# Patient Record
Sex: FEMALE | Employment: OTHER | URBAN - METROPOLITAN AREA
[De-identification: names, ages, dates, MRNs, and addresses within clinical notes are randomized per-mention and may not be internally consistent; named-entity substitution may affect disease eponyms.]

---

## 2022-04-13 NOTE — TELEPHONE ENCOUNTER
No imaging in care everywhere or [facility]  Spoke to patients daughter who states the will bring CD with MRI and Xrays.     -Need to know facility patient had imaging done at.:[facility]

## 2022-04-13 NOTE — TELEPHONE ENCOUNTER
Patient is coming in for Left knee Meniscus tear Patient had imaging done at external facility,Imaging can be viewed in Epic. Please review imaging, and if further imaging is needed please place Rx .   Future Appointments   Date Time Provider Nellie Stevenson   4/21/2022  2:00 PM Luiza Okeefe MD [facility] [id number]

## 2022-04-28 NOTE — TELEPHONE ENCOUNTER
pls schedule pt's LT KNEE MONOVISC procedure with either KAUSHIK or TRACY at [facility] location. Thanks!

## 2022-05-02 NOTE — TELEPHONE ENCOUNTER
Future Appointments   Date Time Provider Nellie Stevenson   5/12/2022 11:00 AM Keo Matthews MD [facility] [id number]

## 2022-05-12 NOTE — PROGRESS NOTES
[facility] Follow-up Progress Note        Chief Complaint:  Left knee pain     History: The patient is a 74year old female who returns for viscosupplement knee injection. The patient is diagnosed with osteoarthritis. The patient is here for administration of Monovisc after having undergone the pre-certification and insurance approval process. No new symptoms are reported. Physical Exam: On examination there is no apparent swelling or palpable warmth about the affected left knee. Mild to moderate crepitus is palpable from near full extension and 110 degrees of flexion. Knee remains stable in all planes with no distal edema. Neurovascular status is intact. Assessment: Diagnoses and all orders for this visit:  Diagnoses and all orders for this visit:    Primary osteoarthritis of left knee       Plan:  The patient presents for viscosupplement knee injection today. Patient understands the nature and risks and gives written consent to proceed. The patient was advised that the benefit may not be noted for 2-4 additional weeks. If there are any adverse reactions I asked that the patient contact us for urgent reassessment. Otherwise, the patient may follow-up as needed. Visco supplement Injection Procedure:  After discussing the risk benefits and alternatives to visco supplement injection including but not limited to needle infection, hypersensitivity reaction or failed improvement, the patient gave verbal consent to proceed. Using meticulous sterile technique I injected 4 cc of 1% Xylocaine at the lateral patellofemoral joint of the left knee for local anesthesia. After attempted aspiration, I injected the entire contents of the Monovisc syringe through an 18-gauge needle to minimal resistance. The patient tolerated this well, a Band-Aid was applied, and instructions were given to contact us with any adverse reactions.      Donny Siegel MD  [facility] Orthopaedic Surgery     The dictation was partially prepared using [employer] voice recognition software.   Although every attempt is made to correct errors where identified, discrepancies may still exist.

## 2022-05-26 ENCOUNTER — TELEPHONE (OUTPATIENT)
Dept: ORTHOPEDICS CLINIC | Facility: CLINIC | Age: 72
End: 2022-05-26

## 2022-05-26 DIAGNOSIS — M25.562 LEFT KNEE PAIN, UNSPECIFIED CHRONICITY: Primary | ICD-10-CM

## 2022-05-26 DIAGNOSIS — M54.50 LUMBAR PAIN: ICD-10-CM

## 2022-05-26 NOTE — TELEPHONE ENCOUNTER
LM for daughter Haven Pair to call back. Daughter is on HIPAA consent. Chart reviewed. Please confirm this was discussed in office for pt to see pain clinic dr (Dr Jelani Michaud) and confirm is for lumbar back pain.

## 2022-05-26 NOTE — TELEPHONE ENCOUNTER
Patient needs a written referral from Dr. Nabor Mejía for them to see Dr. Reta Moulton for her back issues. Dr. Mehran Judge only works from referrals. Please enter a referral and fax it for the patient:  [phone]     Call daughter Marvetta Essex with questions.

## 2022-05-26 NOTE — TELEPHONE ENCOUNTER
Spoke with daughter Salo Benton, who is on hipaa consent. States Dr Shadi Sarkar recommended pt see Dr Roberta Cerna with  pain clinic for lower back pain. Pain clinic requires a referral be faxed in order for pt to schedule. Referral pended. Referral to be faxed to [phone]. To let daughter know when done so can schedule.

## 2023-07-13 ENCOUNTER — OFFICE VISIT (OUTPATIENT)
Dept: ORTHOPEDICS CLINIC | Facility: CLINIC | Age: 73
End: 2023-07-13
Payer: MEDICARE

## 2023-07-13 DIAGNOSIS — M17.12 PRIMARY OSTEOARTHRITIS OF LEFT KNEE: Primary | ICD-10-CM

## 2023-07-13 PROCEDURE — 99214 OFFICE O/P EST MOD 30 MIN: CPT | Performed by: ORTHOPAEDIC SURGERY

## 2023-07-13 NOTE — PROGRESS NOTES
[facility] Note    CC: Current left knee pain    HPI: The patient is a 74year old [place] female returning for orthopedic consultation due to recurrent pain at the anterior and medial left knee. She has struggled with chronic knee pain from osteoarthritis. His symptoms have worsened since the end of the spring with localized anterior and medial pain, stiffness, mild suspected swelling but no instability catching or locking. She and her  wonder if there are treatment options including repeat viscosupplementation. She has tolerated this well in the past without complications and substantial relief. Fortunately her right knee is doing well. Past Medical History:   Diagnosis Date    Essential hypertension      History reviewed. No pertinent surgical history. Current Outpatient Medications   Medication Sig Dispense Refill    diclofenac 75 MG Oral Tab EC Take 1 tablet (75 mg total) by mouth 2 (two) times daily as needed. amLODIPine 2.5 MG Oral Tab Take 1 tablet (2.5 mg total) by mouth daily. ALPRAZolam 0.25 MG Oral Tab Take 1 tablet (0.25 mg total) by mouth daily. gabapentin 300 MG Oral Cap Take 1 capsule (300 mg total) by mouth daily. ascorbic acid 1000 MG Oral Tab Take 1 tablet (1,000 mg total) by mouth daily. Cholecalciferol (VITAMIN D) 50 MCG (2000 UT) Oral Tab Take by mouth As Directed. Multiple Vitamins-Minerals (MULTIVITAMIN ADULT EXTRA C OR) Take 1 capsule by mouth daily. No Known Allergies  History reviewed. No pertinent family history. Social History    Occupational History      Not on file    Tobacco Use      Smoking status: Never      Smokeless tobacco: Never    Vaping Use      Vaping Use: Never used    Substance and Sexual Activity      Alcohol use: Never      Drug use: Never      Sexual activity: Not on file       ROS:  Complete ROS reviewed by me and non-contributory to the chief complaint except as mentioned above.     Physical Exam:    There were no vitals taken for this visit. Constitutional: Well developed, well nourished 74year old female  Psychological: NAD, alert and appropriate  Respiratory: Breathing comfortably on room air with RR of 10-14  Cardiac: Palpable distal pulses with pink warm extremities distally  Examination of the knees reveals varus alignment. There is no palpable effusion or warmth bilaterally. Extensor mechanism is nontender to palpation at the insertions. Intermittent click and crepitus is noted at the patellofemoral joint through an arc of motion estimated at 3-115 degrees on the left. Medial joint line is tender with moderate pain on Cody's and El's tests. Ligaments are stable on varus valgus stress with no pain and solid endpoints. Lachman and posterior drawer are negative. Neurovascular status is intact on sensory, motor and perfusion assessment distally. Imaging: Multiple views of the left knee 4/21/2022 personally reviewed, demonstrating fairly severe medial and patellofemoral osteoarthritic changes without acute bony abnormalities. Assessment/Diagnoses: Left knee osteoarthritis and recurrent pain      Plan:  I reviewed imaging and exam findings with the patient and her . The diagnosis is degenerative joint disease of the knee. We discussed the etiology, natural history and treatment options in detail. Treatments include activity modification, weight loss, anti-inflammatory use and possible injections. In the long term, the patient may become a candidate for total knee arthroplasty, but only if symptoms become severe despite exhaustive non-operative care. For now, non-surgical treatment options are recommended. We discussed the option for knee corticosteroid injection along with the potential benefits, alternatives and associated risks. Given minimal signs of synovitis or effusion, we also discussed the option for repeat intra-articular viscosupplementation alternatives.   Teena Landeros informational brochure was provided. Risk, benefits and alternatives to visco supplement injection were reviewed including but not limited to needle infection, hypersensitivity reaction or failed improvement. Unlike cortisone, benefit from viscosupplementation may take up to 4 weeks before noticeable improvement. The patient expressed understanding and a desire to proceed. We will therefore submit for insurance precertification for Monovisc injection and see the patient back in follow-up for administration once approved. All questions were answered and the patient verbalized understanding and appreciation. Alina Booker MD, [place] [address] Medicine/Knee and Shoulder  [facility] Department of Orthopaedics  Phone [phone]  Fax [phone]      This document was partially prepared using [employer] voice recognition software.   Although every attempt is made to correct errors during dictation, discrepancies may still exist.

## 2023-07-18 ENCOUNTER — TELEPHONE (OUTPATIENT)
Dept: ORTHOPEDICS CLINIC | Facility: CLINIC | Age: 73
End: 2023-07-18

## 2023-08-24 ENCOUNTER — OFFICE VISIT (OUTPATIENT)
Dept: ORTHOPEDICS CLINIC | Facility: CLINIC | Age: 73
End: 2023-08-24
Payer: MEDICARE

## 2023-08-24 VITALS — BODY MASS INDEX: 23.55 KG/M2 | HEIGHT: 62 IN | WEIGHT: 128 LBS

## 2023-08-24 DIAGNOSIS — M17.12 PRIMARY OSTEOARTHRITIS OF LEFT KNEE: Primary | ICD-10-CM

## 2023-08-24 DIAGNOSIS — M25.562 LEFT KNEE PAIN, UNSPECIFIED CHRONICITY: ICD-10-CM

## 2023-08-24 PROCEDURE — 20610 DRAIN/INJ JOINT/BURSA W/O US: CPT | Performed by: PHYSICIAN ASSISTANT

## 2023-08-24 NOTE — PROGRESS NOTES
[facility] Progress Note      Chief Complaint:  Left knee pain      Subjective: Juanita Harley is a 74year old female who is here with her  for follow-up of her left knee. She is here for administration of Monovisc injection to the knee today. Objective: Exam of the left knee and lower extremity reveals that the overlying skin is intact. She has a trace palpable effusion. Sensation is present to light touch. Assessment: Left knee degenerative joint disease      Plan: It would be reasonable to go ahead with a Monovisc injection today. She would like to proceed. She will follow-up with us with recurrent symptoms, questions or concerns. Risk, benefits, and alternatives to the Monovisc injection was discussed including but not limited to risk of needle infection, flareup, and failure to improve. They would like to proceed and under meticulous sterile technique, 4 cc of Xylocaine was used anesthetize the lateral patellofemoral joint of the left knee. Then through an 18-gauge needle, the entire contents of the Monovisc injection was administered with free flow fluid. They tolerated the injection well and a Band-Aid was applied. They will follow-up with any adverse reactions.           Kathi Oakes PA-C  Orthopedic Surgery   [facility]  Bobbi Patterson, [address]   t: [phone]  f: [phone]